# Patient Record
Sex: FEMALE | Race: WHITE
[De-identification: names, ages, dates, MRNs, and addresses within clinical notes are randomized per-mention and may not be internally consistent; named-entity substitution may affect disease eponyms.]

---

## 2018-10-23 ENCOUNTER — HOSPITAL ENCOUNTER (OUTPATIENT)
Dept: HOSPITAL 74 - JASU-SURG | Age: 28
Discharge: HOME | End: 2018-10-23
Attending: OBSTETRICS & GYNECOLOGY
Payer: COMMERCIAL

## 2018-10-23 VITALS — SYSTOLIC BLOOD PRESSURE: 118 MMHG | DIASTOLIC BLOOD PRESSURE: 51 MMHG | TEMPERATURE: 97.8 F | HEART RATE: 68 BPM

## 2018-10-23 VITALS — BODY MASS INDEX: 24.3 KG/M2

## 2018-10-23 DIAGNOSIS — O02.1: Primary | ICD-10-CM

## 2018-10-23 LAB
ANISOCYTOSIS BLD QL: 0
BASOPHILS # BLD: 0.2 % (ref 0–2)
DEPRECATED RDW RBC AUTO: 12.8 % (ref 11.6–15.6)
EOSINOPHIL # BLD: 0.3 % (ref 0–4.5)
HCT VFR BLD CALC: 36.8 % (ref 32.4–45.2)
HGB BLD-MCNC: 12.1 GM/DL (ref 10.7–15.3)
LYMPHOCYTES # BLD: 5.9 % (ref 8–40)
MACROCYTES BLD QL: 0
MCH RBC QN AUTO: 29.7 PG (ref 25.7–33.7)
MCHC RBC AUTO-ENTMCNC: 32.9 G/DL (ref 32–36)
MCV RBC: 90.1 FL (ref 80–96)
MONOCYTES # BLD AUTO: 2.3 % (ref 3.8–10.2)
NEUTROPHILS # BLD: 91.3 % (ref 42.8–82.8)
PLATELET # BLD AUTO: 149 K/MM3 (ref 134–434)
PLATELET BLD QL SMEAR: NORMAL
PMV BLD: 10 FL (ref 7.5–11.1)
RBC # BLD AUTO: 4.09 M/MM3 (ref 3.6–5.2)
WBC # BLD AUTO: 13.4 K/MM3 (ref 4–10)

## 2018-10-23 PROCEDURE — 10D17ZZ EXTRACTION OF PRODUCTS OF CONCEPTION, RETAINED, VIA NATURAL OR ARTIFICIAL OPENING: ICD-10-PCS | Performed by: OBSTETRICS & GYNECOLOGY

## 2018-10-23 NOTE — OP
Operative Note





- Note:


Operative Date: 10/23/18


Pre-Operative Diagnosis: 29yo P0 with Missed , dropping HCG


Operation: Sonographically guided Suction Curettage


Findings: 





Uterus ~ 10wk


Retroverted 


Contents suctioned


Malodorous urine, sent for culture


Post-Operative Diagnosis: Same as Pre-op


Surgeon: Lay Hernandez


Anesthesiologist/CRNA: Leventhal,Joshua


Anesthesia: MAC


Specimens Removed: Products of conseption


Estimated Blood Loss (mls): 200


Drains, Volume Out (mls): 100


Fluid Volume Replaced (mls): 600


Operative Report Dictated: Yes

## 2018-10-23 NOTE — OP
DATE OF OPERATION:  10/23/2018

 

PREOPERATIVE DIAGNOSIS:  A 28-year-old para 0 with missed  and dropping hCG.

 

OPERATION:  Sonographically guided suction curettage.

 

FINDINGS:  Uterus approximately 10 weeks, retroverted.  Contents suctioned. 

Malodorous urine sent for culture.

 

POSTOPERATIVE DIAGNOSIS:  A 28-year-old para 0 with missed  and dropping hCG.

 

SURGEON:  Lay Hernandez MD

 

ANESTHESIOLOGIST:  Joshua Leventhal, CRNA

 

ANESTHESIA:  MAC.

 

SPECIMENS SENT TO PATHOLOGY:  Products of conception.

 

DESCRIPTION OF OPERATIVE PROCEDURE:  After assuring informed consent, patient was

brought to the operating room.  After appropriate anesthesia, perineum was prepped

and draped in a sterile fashion.  The Gamble retractor is placed in the vagina. 

Anterior cervical lip was articulated with single-tooth tenaculum and cervix dilated

to accommodate gauge 3 curette.  Under the sonographic guidance, curette introduced

into the uterus, and several attempts were made to suction intrauterine contents

until uterus was found to be empty.  Gentle sharp curettage was performed in each

quadrant once.  Excellent hemostasis was noted.  Uterus decreased in size to

approximately 7 weeks.  The products of conception sent to the pathology.  All

instruments removed from the cervix and the vagina.  Count correct x2 of instrument

and sponges.  Patient placed in dorsal supine position and brought to the recovery

room in stable condition.

 

ESTIMATED BLOOD LOSS:  200 mL

 

URINE OUTPUT:  100 mL

 

Patient received 600 mL of IV fluids.

 

 

ZOE [id number]

DD: 10/23/2018 16:46

DT: 10/23/2018 17:37

Job #:  [id number]

## 2018-10-23 NOTE — HP
History & Physical Update





- History


History: No Change





- Physical


Physical: No Change





- Assessment


Assessment: No Change





- Plan


Plan: No Change (Consent signed and witnessed, all questions answered procedure 

explained once more)

## 2018-10-24 NOTE — PATH
Surgical Pathology Report



Patient Name:  OSMANY BAIRES

Accession #:  [id number]

Med. Rec. #:  [id number]                                                        

   /Age/Gender:  1990 (Age: 28) / F

Account:  [id number]                                                          

             Location: [facility]

Taken:  10/23/2018

Received:  10/23/2018

Reported:  10/24/2018

Physicians:  Lay Hernandez M.D.

  



Specimen(s) Received

 PRODUCTS OF CONCEPTION (WITH RPMI) 





Clinical History

Missed 







Final Diagnosis

PRODUCTS OF CONCEPTION, SUCTION DILATION AND CURETTAGE:

IMMATURE CHORIONIC VILLI AND GESTATIONAL ENDOMETRIUM CONSISTENT WITH PRODUCTS OF

CONCEPTION.

CHROMOSOMAL ANALYSIS IS PENDING. RESULTS WILL BE REPORTED AS AN ADDENDUM.







***Electronically Signed***

Jaymie Perez M.D.





Gross Description

Received fresh labeled "products of conception," is a 10.0 x 7.5 x 0.7 cm

aggregate of tan red soft tissue fragments. Villous tissue is identified. No

definite fetal somatic tissue is identified. Representative portion is placed in

RPMI solution and sent for genetic studies. Additional representative portion is

submitted in one cassette.

/10/23/2018 



saudi/10/23/2018

## 2019-11-07 ENCOUNTER — HOSPITAL ENCOUNTER (INPATIENT)
Dept: HOSPITAL 74 - JLDR | Age: 29
LOS: 2 days | Discharge: HOME | End: 2019-11-09
Attending: OBSTETRICS & GYNECOLOGY | Admitting: OBSTETRICS & GYNECOLOGY
Payer: COMMERCIAL

## 2019-11-07 VITALS — BODY MASS INDEX: 29.4 KG/M2

## 2019-11-07 DIAGNOSIS — Z3A.38: ICD-10-CM

## 2019-11-07 LAB
ALBUMIN SERPL-MCNC: 3.1 G/DL (ref 3.4–5)
ALP SERPL-CCNC: 177 U/L (ref 45–117)
ALT SERPL-CCNC: 30 U/L (ref 13–61)
ANION GAP SERPL CALC-SCNC: 4 MMOL/L (ref 8–16)
APPEARANCE UR: CLEAR
APTT BLD: 29.8 SECONDS (ref 25.2–36.5)
AST SERPL-CCNC: 24 U/L (ref 15–37)
BASOPHILS # BLD: 0.3 % (ref 0–2)
BASOPHILS # BLD: 0.4 % (ref 0–2)
BILIRUB SERPL-MCNC: 0.2 MG/DL (ref 0.2–1)
BILIRUB UR STRIP.AUTO-MCNC: NEGATIVE MG/DL
BUN SERPL-MCNC: 9.5 MG/DL (ref 7–18)
CALCIUM SERPL-MCNC: 9.2 MG/DL (ref 8.5–10.1)
CHLORIDE SERPL-SCNC: 109 MMOL/L (ref 98–107)
CO2 SERPL-SCNC: 25 MMOL/L (ref 21–32)
COLOR UR: YELLOW
CREAT SERPL-MCNC: 0.8 MG/DL (ref 0.55–1.3)
DEPRECATED RDW RBC AUTO: 13.8 % (ref 11.6–15.6)
DEPRECATED RDW RBC AUTO: 14 % (ref 11.6–15.6)
EOSINOPHIL # BLD: 0.2 % (ref 0–4.5)
EOSINOPHIL # BLD: 0.6 % (ref 0–4.5)
GLUCOSE SERPL-MCNC: 81 MG/DL (ref 74–106)
HCT VFR BLD CALC: 40 % (ref 32.4–45.2)
HCT VFR BLD CALC: 41.3 % (ref 32.4–45.2)
HGB BLD-MCNC: 13.3 GM/DL (ref 10.7–15.3)
HGB BLD-MCNC: 13.9 GM/DL (ref 10.7–15.3)
INR BLD: 0.93 (ref 0.83–1.09)
INR BLD: 0.93 (ref 0.83–1.09)
KETONES UR QL STRIP: NEGATIVE
LEUKOCYTE ESTERASE UR QL STRIP.AUTO: NEGATIVE
LYMPHOCYTES # BLD: 12.5 % (ref 8–40)
LYMPHOCYTES # BLD: 19.1 % (ref 8–40)
MCH RBC QN AUTO: 30.5 PG (ref 25.7–33.7)
MCH RBC QN AUTO: 31 PG (ref 25.7–33.7)
MCHC RBC AUTO-ENTMCNC: 33.1 G/DL (ref 32–36)
MCHC RBC AUTO-ENTMCNC: 33.6 G/DL (ref 32–36)
MCV RBC: 92.1 FL (ref 80–96)
MCV RBC: 92.2 FL (ref 80–96)
MONOCYTES # BLD AUTO: 4.9 % (ref 3.8–10.2)
MONOCYTES # BLD AUTO: 6.5 % (ref 3.8–10.2)
NEUTROPHILS # BLD: 73.4 % (ref 42.8–82.8)
NEUTROPHILS # BLD: 82.1 % (ref 42.8–82.8)
NITRITE UR QL STRIP: NEGATIVE
PH UR: 5.5 [PH] (ref 5–8)
PLATELET # BLD AUTO: 145 K/MM3 (ref 134–434)
PLATELET # BLD AUTO: 151 K/MM3 (ref 134–434)
PMV BLD: 11.2 FL (ref 7.5–11.1)
PMV BLD: 11.3 FL (ref 7.5–11.1)
POTASSIUM SERPLBLD-SCNC: 3.9 MMOL/L (ref 3.5–5.1)
PROT SERPL-MCNC: 6.4 G/DL (ref 6.4–8.2)
PROT UR QL STRIP: NEGATIVE
PROT UR QL STRIP: NEGATIVE
PT PNL PPP: 11 SEC (ref 9.7–13)
PT PNL PPP: 11 SEC (ref 9.7–13)
RBC # BLD AUTO: 4.34 M/MM3 (ref 3.6–5.2)
RBC # BLD AUTO: 4.48 M/MM3 (ref 3.6–5.2)
SODIUM SERPL-SCNC: 138 MMOL/L (ref 136–145)
SP GR UR: 1.01 (ref 1.01–1.03)
URATE SERPL-SCNC: 7.6 MG/DL (ref 2.6–7.2)
UROBILINOGEN UR STRIP-MCNC: 0.2 MG/DL (ref 0.2–1)
WBC # BLD AUTO: 9.9 K/MM3 (ref 4–10)
WBC # BLD AUTO: 9.9 K/MM3 (ref 4–10)

## 2019-11-07 NOTE — PN
Progress Note (short form)





- Note


Progress Note: 





Epidural placement attempted at 1830 x 2. both attempts done in usual fashion. 

both attempts ahd heme returned via cath aspiration prior to any meds given. 

procedure aborted to draw rpt labs and do serial neuro checks on LE.  pt did 

well with IV meds after attempts.  labs normal and neuro checks negative. re 

attempt at epi placement. see anesthesia record.

## 2019-11-07 NOTE — HP
Past Medical History





- Primary Care Physician


PCP:: Lay Hernandez





- Admission


Chief Complaint: 30yo P 0010 @ 38.4wks with IUFD documented in the office today

; She reported not feeling the baby move for ~ 2days. She reports some cramping


History of Present Illness: 





Pregnancy only complicated by


1. 2 vesel umbilical cord, but normal growth 


2. borderline elevated BPs, no proteinuria, asymptomatic - no PEC symptoms


3. h/o SAB


History Source: Patient, Medical Record


Limitations to Obtaining History: No Limitations





- Past Medical History


...: 2


...Para: 0


...Term: 0


...: 0


...Spon : 1 (2018 1st trimester demise)


...Induced : 0


...Multiple Gestation: 0


...LMP: 19


... Weeks Gestation by Dates: 38.6


...EDC by Dates: 11/15/19


...EDC by Sono: 19





- Past Surgical History


Hx Myomectomy: No


Hx Transabdominal Cerclage: No


Additional Surgical History: D&C





- Smoking History


Smoking history: Never smoked


Have you smoked in the past 12 months: No





- Alcohol/Substance Use


Hx Alcohol Use: No


History of Substance Use: reports: None





- Social History


Usual Living Arrangement: Yes: With Spouse


History of Recent Travel: Yes ([place])





Home Medications





- Allergies


Allergies/Adverse Reactions: 


 Allergies











Allergy/AdvReac Type Severity Reaction Status Date / Time


 


No Known Allergies Allergy   Verified 19 12:10














- Home Medications


Home Medications: 


Ambulatory Orders





Prenatal Vit 93/Iron Fum/Folic [Prenatal Formula Tablet] 1 each PO DAILY PRN 10/

22/18 











Family Medical History


Family Hx Cancer: Grandmother (maternal) (Melanoma)


Family Hx Cardiac Disorders: Father





Review of Systems





- Review of Systems


Constitutional: reports: No Symptoms


Eyes: reports: No Symptoms


HENT: reports: No Symptoms


Neck: reports: No Symptoms


Cardiovascular: reports: No Symptoms


Respiratory: reports: No Symptoms


Gastrointestinal: reports: No Symptoms


Genitourinary: reports: No Symptoms


Breasts: reports: No Symptoms Reported


Musculoskeletal: reports: No Symptoms


Integumentary: reports: No Symptoms


Neurological: reports: No Symptoms


Endocrine: reports: No Symptoms


Hematology/Lymphatic: reports: No Symptoms


Psychiatric: reports: No Symptoms





Physical Exam - Maternity


Vital Signs: 


 Vital Signs











Temperature  98.3 F   19 17:00


 


Pulse Rate  65   19 17:00


 


Respiratory Rate  18   19 17:00


 


Blood Pressure  137/85   19 17:00


 


O2 Sat by Pulse Oximetry (%)      











Constitutional: Yes: Well Nourished, No Distress, Calm


Eyes: Yes: WNL, Conjunctiva Clear


HENT: Yes: WNL, Atraumatic, Normocephalic


Neck: Yes: WNL, Supple, Trachea Midline


Cardiovascular: Yes: WNL, Regular Rate and Rhythm


Lungs: Clear to auscultation


Breast(s): Yes: WNL





- Abdominal Exam/OB


Fundal Height: 38 (7lb)


Number of Fetuses: Single


Fetal Presentation: Vertex


Contractions: No


Fetal Heart Rate (range): 0





- Vaginal Exam/OB


Vaginal Bleediing: No


Dilatation (cm): 2


Effacement (%): 75%


Amniotic Membrane Status: Intact


Fetal Station: -3





- Physical Exam


Musculoskeletal: Yes: WNL


Extremities: Yes: WNL


Edema: No


Integumentary: Yes: WNL


...Motor Strength: WNL


Psychiatric: Yes: WNL, Alert, Oriented





- Labs


Lab Results: 


 CBC, BMP





 19 12:00 





 19 12:00 











Assessment/Plan





30yo P0 @ 38.4 wks


with IUFD of unknown etiology


Borderline BPs


but no Preeclampsia symptoms


Admit to L&D, IVF


Will send Preeclampsia blood work and monitor BPs


Favorable VE - Start Pitocin 


Pain managment as needed

## 2019-11-07 NOTE — PN
Progress Note, Labor


  ** Vaginal Exam #1


Labor Exam Date: 11/07/19


Labor Exam Time: 20:20


Fetal Heart Rate (range): 0


Dilatation: 6


Effacement (%): 100


Amniotic Membrane Status: Ruptured


Fetal Presentation: Vertex/Position


Fetal Station: -2 (Adequate progress of labor)

## 2019-11-08 LAB
ALBUMIN SERPL-MCNC: 2.8 G/DL (ref 3.4–5)
ALP SERPL-CCNC: 166 U/L (ref 45–117)
ALT SERPL-CCNC: 26 U/L (ref 13–61)
ALT SERPL-CCNC: 26 U/L (ref 13–61)
ANION GAP SERPL CALC-SCNC: 7 MMOL/L (ref 8–16)
AST SERPL-CCNC: 22 U/L (ref 15–37)
AST SERPL-CCNC: 24 U/L (ref 15–37)
BASOPHILS # BLD: 0 % (ref 0–2)
BASOPHILS # BLD: 0.1 % (ref 0–2)
BILIRUB CONJ SERPL-MCNC: 0.1 MG/DL (ref 0–0.2)
BILIRUB SERPL-MCNC: 0.4 MG/DL (ref 0.2–1)
BUN SERPL-MCNC: 7.5 MG/DL (ref 7–18)
CALCIUM SERPL-MCNC: 8.3 MG/DL (ref 8.5–10.1)
CHLORIDE SERPL-SCNC: 109 MMOL/L (ref 98–107)
CO2 SERPL-SCNC: 25 MMOL/L (ref 21–32)
CREAT SERPL-MCNC: 0.9 MG/DL (ref 0.55–1.3)
DEPRECATED RDW RBC AUTO: 14.1 % (ref 11.6–15.6)
DEPRECATED RDW RBC AUTO: 14.5 % (ref 11.6–15.6)
EOSINOPHIL # BLD: 0 % (ref 0–4.5)
EOSINOPHIL # BLD: 0 % (ref 0–4.5)
GLUCOSE SERPL-MCNC: 98 MG/DL (ref 74–106)
HCT VFR BLD CALC: 38.2 % (ref 32.4–45.2)
HCT VFR BLD CALC: 39.5 % (ref 32.4–45.2)
HGB BLD-MCNC: 12.8 GM/DL (ref 10.7–15.3)
HGB BLD-MCNC: 13.1 GM/DL (ref 10.7–15.3)
LYMPHOCYTES # BLD: 5.6 % (ref 8–40)
LYMPHOCYTES # BLD: 8.6 % (ref 8–40)
MAGNESIUM SERPL-MCNC: 2 MG/DL (ref 1.8–2.4)
MCH RBC QN AUTO: 30.6 PG (ref 25.7–33.7)
MCH RBC QN AUTO: 30.7 PG (ref 25.7–33.7)
MCHC RBC AUTO-ENTMCNC: 33.2 G/DL (ref 32–36)
MCHC RBC AUTO-ENTMCNC: 33.5 G/DL (ref 32–36)
MCV RBC: 91.8 FL (ref 80–96)
MCV RBC: 92.4 FL (ref 80–96)
MONOCYTES # BLD AUTO: 3.5 % (ref 3.8–10.2)
MONOCYTES # BLD AUTO: 5.3 % (ref 3.8–10.2)
NEUTROPHILS # BLD: 86 % (ref 42.8–82.8)
NEUTROPHILS # BLD: 90.9 % (ref 42.8–82.8)
PLATELET # BLD AUTO: 148 K/MM3 (ref 134–434)
PLATELET # BLD AUTO: 157 K/MM3 (ref 134–434)
PMV BLD: 10.9 FL (ref 7.5–11.1)
PMV BLD: 11.4 FL (ref 7.5–11.1)
POTASSIUM SERPLBLD-SCNC: 4.4 MMOL/L (ref 3.5–5.1)
PROT SERPL-MCNC: 6 G/DL (ref 6.4–8.2)
RBC # BLD AUTO: 4.16 M/MM3 (ref 3.6–5.2)
RBC # BLD AUTO: 4.27 M/MM3 (ref 3.6–5.2)
SODIUM SERPL-SCNC: 140 MMOL/L (ref 136–145)
URATE SERPL-SCNC: 7.2 MG/DL (ref 2.6–7.2)
WBC # BLD AUTO: 15.7 K/MM3 (ref 4–10)
WBC # BLD AUTO: 18.3 K/MM3 (ref 4–10)

## 2019-11-08 PROCEDURE — 0HQ9XZZ REPAIR PERINEUM SKIN, EXTERNAL APPROACH: ICD-10-PCS | Performed by: OBSTETRICS & GYNECOLOGY

## 2019-11-08 RX ADMIN — FERROUS SULFATE TAB EC 324 MG (65 MG FE EQUIVALENT) SCH MG: 324 (65 FE) TABLET DELAYED RESPONSE at 14:14

## 2019-11-08 RX ADMIN — FERROUS SULFATE TAB EC 324 MG (65 MG FE EQUIVALENT) SCH MG: 324 (65 FE) TABLET DELAYED RESPONSE at 22:14

## 2019-11-08 RX ADMIN — Medication SCH MLS/HR: at 08:00

## 2019-11-08 RX ADMIN — Medication SCH MLS/HR: at 12:00

## 2019-11-08 RX ADMIN — NIFEDIPINE SCH MG: 30 TABLET, EXTENDED RELEASE ORAL at 15:53

## 2019-11-08 RX ADMIN — Medication SCH TAB: at 14:14

## 2019-11-08 NOTE — PN
Delivery





- Delivery


Vaginal Delivery: No Problems


Type of Anesthesia: Epidural


Episiotomy/Laceration: Midline, 1st degree


EBL (cc): 100





Delivery, Single Birth





- Stages of Labor


Placenta: Yes: Spontaneous





- Condition of Infant


Infant Gender: Female


Position: Left, OP





- Apgar


  ** 1 Minute


Apgar Total Score: 0





  ** 5 Minutes


Apgar Total Score: 0





Remarks





- Remarks


Remarks: 





delivered non viable, macerated fetus female over 1st degree laceration


in Occiput Posterior;


large amount of meconium came out vaginally


Patient tolerated procedure well


Laceration repaired with 2-0 Chromic

## 2019-11-09 VITALS — DIASTOLIC BLOOD PRESSURE: 96 MMHG | SYSTOLIC BLOOD PRESSURE: 137 MMHG | TEMPERATURE: 98.6 F | HEART RATE: 72 BPM

## 2019-11-09 LAB
BASOPHILS # BLD: 0.4 % (ref 0–2)
DEPRECATED RDW RBC AUTO: 13.9 % (ref 11.6–15.6)
EOSINOPHIL # BLD: 0.6 % (ref 0–4.5)
HCT VFR BLD CALC: 38.6 % (ref 32.4–45.2)
HGB BLD-MCNC: 13 GM/DL (ref 10.7–15.3)
LYMPHOCYTES # BLD: 9.2 % (ref 8–40)
MCH RBC QN AUTO: 31.2 PG (ref 25.7–33.7)
MCHC RBC AUTO-ENTMCNC: 33.7 G/DL (ref 32–36)
MCV RBC: 92.5 FL (ref 80–96)
MONOCYTES # BLD AUTO: 3.8 % (ref 3.8–10.2)
NEUTROPHILS # BLD: 86 % (ref 42.8–82.8)
PLATELET # BLD AUTO: 156 K/MM3 (ref 134–434)
PMV BLD: 11.1 FL (ref 7.5–11.1)
RBC # BLD AUTO: 4.17 M/MM3 (ref 3.6–5.2)
WBC # BLD AUTO: 13.9 K/MM3 (ref 4–10)

## 2019-11-09 RX ADMIN — ACETAMINOPHEN PRN MG: 325 TABLET ORAL at 06:14

## 2019-11-09 RX ADMIN — Medication SCH: at 10:32

## 2019-11-09 RX ADMIN — ACETAMINOPHEN PRN MG: 325 TABLET ORAL at 10:01

## 2019-11-09 RX ADMIN — FERROUS SULFATE TAB EC 324 MG (65 MG FE EQUIVALENT) SCH MG: 324 (65 FE) TABLET DELAYED RESPONSE at 10:01

## 2019-11-09 RX ADMIN — NIFEDIPINE SCH MG: 30 TABLET, EXTENDED RELEASE ORAL at 10:01

## 2019-11-09 NOTE — PN
Post Partum Progress Note





- Subjective


Subjective: 





Patient without acute complaints. 


Reports tolerating oral intake without nausea or vomiting. 


Ambulating without dizziness. 


Denies fevers or chills.


Pain well controlled with oral pain medication.


Passing flatus.


Post Partum Day: 1


Type of Delivery: 


Vital Signs: 


 Vital Signs











Temperature  97.8 F   19 12:35


 


Pulse Rate  77   19 12:35


 


Respiratory Rate  18   19 12:35


 


Blood Pressure  139/96   19 12:35


 


O2 Sat by Pulse Oximetry (%)  100   19 08:50











Breast Exam: Yes: Soft


Uterus: Yes: Fundus Firm


Abdomen/GI: Yes: Abdomen soft


Lochia: Yes: Rubra


Lochia, amount: Small


Extremities: Yes: Calves non-tender


Perineum: Yes: Intact


Activity: Ambulating





- Labs


Labs: 


 CBC











WBC  13.9 K/mm3 (4.0-10.0)  H  19  08:01    


 


Corrected WBC (auto)  Cancelled   19  19:30    


 


RBC  4.17 M/mm3 (3.60-5.2)   19  08:01    


 


Hgb  13.0 GM/dL (10.7-15.3)   19  08:01    


 


Hct  38.6 % (32.4-45.2)   19  08:01    


 


MCV  92.5 fl (80-96)   19  08:01    


 


MCH  31.2 pg (25.7-33.7)   19  08:01    


 


MCHC  33.7 g/dl (32.0-36.0)   19  08:01    


 


RDW  13.9 % (11.6-15.6)   19  08:01    


 


Plt Count  156 K/MM3 (134-434)   19  08:01    


 


MPV  11.1 fl (7.5-11.1)   19  08:01    


 


Absolute Neuts (auto)  11.9 K/mm3 (1.5-8.0)  H  19  08:01    


 


Neutrophils %  86.0 % (42.8-82.8)  H  19  08:01    


 


Lymphocytes %  9.2 % (8-40)   19  08:01    


 


Monocytes %  3.8 % (3.8-10.2)   19  08:01    


 


Eosinophils %  0.6 % (0-4.5)  D 19  08:01    


 


Basophils %  0.4 % (0-2.0)  D 19  08:01    


 


Nucleated RBC %  0 % (0-0)   19  08:01    


 


Platelet Estimate  Cancelled   19  19:30    


 


Platelet Comment  Cancelled   19  19:30    


 


Retic Count  1.66 % (0.5-1.5)  H  19  12:00    


 


Haptoglobin  56 mg/dL ()   19  12:00    














Assessment/Plan





28yo P0 PPD# 1


post VD of IUFD


Doing well, afebrile


Appropriate mood


Rh pos


Counselled NPV x 6wks


PEC precautions


d/c on Procardia XL 30mg


Breast care discussed

## 2019-11-12 LAB — PH SPEC: (no result) [PH]
